# Patient Record
(demographics unavailable — no encounter records)

---

## 2025-07-12 NOTE — DISCUSSION/SUMMARY
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 Based upon the patients continued symptoms and failure to respond to 3 months of conservative treatment I have recommended a Left Total Knee Replacement for this patient. A long discussion took place with the patient describing what a total joint replacement is and what the procedure would entail. A Total Knee model, similar to the implant that will be used during the operation was utilized to demonstrate and to discuss the various bearing surfaces of the implants.   The benefits of surgery were discussed with the patient including the potential for improving their current clinical condition through operative intervention. Alternatives to surgical intervention including continued conservative management were also discussed in detail.   The hospitalization and post-operative care and rehabilitation were also discussed. The use of perioperative antibiotics and DVT prophylaxis were discussed. The risk, benefits and alternatives to a surgical intervention were discussed at length with the patient. The patient was also advised of risks related to the medical comorbidities and elevated body mass index (BMI). A lengthy discussion took place to review the most common complications including but not limited to: deep vein thrombosis, pulmonary embolus, heart attack, stroke, infection, wound breakdown, numbness, damage to nerves, tendon, muscles, arteries or other blood vessels, death and other possible complications from anesthesia. The patient was told that we will take steps to minimize these risks by using sterile technique, antibiotics and DVT prophylaxis when appropriate and follow the patient postoperatively in the office setting to monitor progress. The possibility of recurrent pain, no improvement in pain and actual worsening of pain were also discussed with the patient.   The discharge plan of care focused on the patient going home following surgery. I encouraged the patient to make the necessary arrangements to have someone stay with them when they are discharged home. Following discharge, a home care nurse will visit the patient. They will open your home care case and request home physical therapy services. Home physical therapy will commence following discharge provided it is appropriate and covered by their] health insurance benefit plan.   All questions were answered to the satisfaction of the patient. The treatment plan of care, as well as a model of a Total Knee equivalent to the one that will be used for their total joint replacement, was shared with the patient. The patient agreed to the plan of care as well as the use of implants in their total joint replacement.   The patient was advised that they will require a medical preoperative risk evaluation by their PCP. Further medical subspecialty clearances such as cardiac may be indicated if felt needed by their PCP.   The patient participated in an interactive discussion of the TKR implant planned for their surgery with questions answered, agreed with the treatment plan, and has decided to move forward with elective TKR as planned.   A DJO Knee implant is the implant I feel comfortable utilizing in my Primary TKRs and the implant I am planning for their TKR. If the patient wishes to utilize a different implant brand/type for their TKR, they may obtain an additional surgical opinion from a Surgeon utilizing that brand implant

## 2025-07-12 NOTE — PHYSICAL EXAM
[Antalgic] : antalgic [LE] : Sensory: Intact in bilateral lower extremities [DP] : dorsalis pedis 2+ and symmetric bilaterally [Normal LLE] : Left Lower Extremity: No scars, rashes, lesions, ulcers, skin intact [Normal Touch] : sensation intact for touch [Normal] : no peripheral adenopathy appreciated [de-identified] : Left Knee: Skin is clean, dry, intact. Swelling: No significant swelling Effusion: No significant effusion Alignment: Neutral alignment Tenderness: None Incision: No visible incisions Skin Temp: Warm to touch ROM: Flexion: 125 deg.; Extension: -3 deg, Laxity A-P: Negative anteroposterior drawer Laxity M-L: Less than 5 degree laxity varus valgus stresses PF crepitus: None Sensation intact throughout the distal lower extremity Pulses: 2+ dorsalis pedis pulses Quad/Ham St: 5/5  [de-identified] : Large vericosities b/l [de-identified] : 3 views, AP, lateral, and sunrise radiographs of the left knee were performed today in the [place] office. KL grade 4 bone-on-bone cartilaginous wear in all 3 compartments. Subchondral sclerosis noted on both sides the T-F  joint. Osteophytes are noted in all three compartments.

## 2025-07-12 NOTE — HISTORY OF PRESENT ILLNESS
[Stable] : stable [4] : a current pain level of 4/10 [Standing] : standing [Intermit.] : ~He/She~ states the symptoms seem to be intermittent [Walking] : worsened by walking [Knee Flexion] : worsened with knee flexion [Knee Extension] : worsened with knee extension [Rest] : relieved by rest [de-identified] : 74-year-old male presents with left knee pain which been ongoing for about 10 years.  He states that the pain has progressively worsened over time.  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  Since losing the significant amount of weight his knee pain has significantly improved.  His pain in the office today is rated as a 4 out of 10.  He is not taking anti-inflammatories or Tylenol for the pain.  He does have some difficulty with going up and down stairs and is not able to go 1 step at a time.  He is now trying to get more into the gym since his weight loss has provided so much more energy for him and it is difficult for him to do certain exercises.  He would like to see what his options are in terms of surgical management at this time.  Pain stays localized to the knee and does not radiate. Denies fevers, chills, chest pain, calf pain, shortness of breath.

## 2025-07-12 NOTE — DISCUSSION/SUMMARY
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 Based upon the patients continued symptoms and failure to respond to 3 months of conservative treatment I have recommended a Left Total Knee Replacement for this patient. A long discussion took place with the patient describing what a total joint replacement is and what the procedure would entail. A Total Knee model, similar to the implant that will be used during the operation was utilized to demonstrate and to discuss the various bearing surfaces of the implants.   The benefits of surgery were discussed with the patient including the potential for improving their current clinical condition through operative intervention. Alternatives to surgical intervention including continued conservative management were also discussed in detail.   The hospitalization and post-operative care and rehabilitation were also discussed. The use of perioperative antibiotics and DVT prophylaxis were discussed. The risk, benefits and alternatives to a surgical intervention were discussed at length with the patient. The patient was also advised of risks related to the medical comorbidities and elevated body mass index (BMI). A lengthy discussion took place to review the most common complications including but not limited to: deep vein thrombosis, pulmonary embolus, heart attack, stroke, infection, wound breakdown, numbness, damage to nerves, tendon, muscles, arteries or other blood vessels, death and other possible complications from anesthesia. The patient was told that we will take steps to minimize these risks by using sterile technique, antibiotics and DVT prophylaxis when appropriate and follow the patient postoperatively in the office setting to monitor progress. The possibility of recurrent pain, no improvement in pain and actual worsening of pain were also discussed with the patient.   The discharge plan of care focused on the patient going home following surgery. I encouraged the patient to make the necessary arrangements to have someone stay with them when they are discharged home. Following discharge, a home care nurse will visit the patient. They will open your home care case and request home physical therapy services. Home physical therapy will commence following discharge provided it is appropriate and covered by their] health insurance benefit plan.   All questions were answered to the satisfaction of the patient. The treatment plan of care, as well as a model of a Total Knee equivalent to the one that will be used for their total joint replacement, was shared with the patient. The patient agreed to the plan of care as well as the use of implants in their total joint replacement.   The patient was advised that they will require a medical preoperative risk evaluation by their PCP. Further medical subspecialty clearances such as cardiac may be indicated if felt needed by their PCP.   The patient participated in an interactive discussion of the TKR implant planned for their surgery with questions answered, agreed with the treatment plan, and has decided to move forward with elective TKR as planned.   A DJO Knee implant is the implant I feel comfortable utilizing in my Primary TKRs and the implant I am planning for their TKR. If the patient wishes to utilize a different implant brand/type for their TKR, they may obtain an additional surgical opinion from a Surgeon utilizing that brand implant

## 2025-07-12 NOTE — HISTORY OF PRESENT ILLNESS
[Stable] : stable [4] : a current pain level of 4/10 [Standing] : standing [Intermit.] : ~He/She~ states the symptoms seem to be intermittent [Walking] : worsened by walking [Knee Flexion] : worsened with knee flexion [Knee Extension] : worsened with knee extension [Rest] : relieved by rest [de-identified] : 74-year-old male presents with left knee pain which been ongoing for about 10 years.  He states that the pain has progressively worsened over time.  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  Since losing the significant amount of weight his knee pain has significantly improved.  His pain in the office today is rated as a 4 out of 10.  He is not taking anti-inflammatories or Tylenol for the pain.  He does have some difficulty with going up and down stairs and is not able to go 1 step at a time.  He is now trying to get more into the gym since his weight loss has provided so much more energy for him and it is difficult for him to do certain exercises.  He would like to see what his options are in terms of surgical management at this time.  Pain stays localized to the knee and does not radiate. Denies fevers, chills, chest pain, calf pain, shortness of breath.

## 2025-07-12 NOTE — PHYSICAL EXAM
[Antalgic] : antalgic [LE] : Sensory: Intact in bilateral lower extremities [DP] : dorsalis pedis 2+ and symmetric bilaterally [Normal LLE] : Left Lower Extremity: No scars, rashes, lesions, ulcers, skin intact [Normal Touch] : sensation intact for touch [Normal] : no peripheral adenopathy appreciated [de-identified] : Left Knee: Skin is clean, dry, intact. Swelling: No significant swelling Effusion: No significant effusion Alignment: Neutral alignment Tenderness: None Incision: No visible incisions Skin Temp: Warm to touch ROM: Flexion: 125 deg.; Extension: -3 deg, Laxity A-P: Negative anteroposterior drawer Laxity M-L: Less than 5 degree laxity varus valgus stresses PF crepitus: None Sensation intact throughout the distal lower extremity Pulses: 2+ dorsalis pedis pulses Quad/Ham St: 5/5  [de-identified] : Large vericosities b/l [de-identified] : 3 views, AP, lateral, and sunrise radiographs of the left knee were performed today in the [place] office. KL grade 4 bone-on-bone cartilaginous wear in all 3 compartments. Subchondral sclerosis noted on both sides the T-F  joint. Osteophytes are noted in all three compartments.

## 2025-07-12 NOTE — END OF VISIT
[FreeTextEntry3] : I, Dr. Choe, personally performed the evaluation and management (E/M) services for this new patient. That E/M includes conducting the clinically appropriate initial history &/or exam, assessing all conditions, and establishing the plan of care. Today, my DEDRA, Kiel Ledesma PA-C, was here to observe my evaluation and management service for this patient & follow plan of care established by me going forward.